# Patient Record
Sex: MALE | Race: WHITE | NOT HISPANIC OR LATINO | URBAN - METROPOLITAN AREA
[De-identification: names, ages, dates, MRNs, and addresses within clinical notes are randomized per-mention and may not be internally consistent; named-entity substitution may affect disease eponyms.]

---

## 2017-04-07 ENCOUNTER — HISTORICAL (OUTPATIENT)
Dept: LAB | Facility: HOSPITAL | Age: 22
End: 2017-04-07

## 2017-04-10 ENCOUNTER — HISTORICAL (OUTPATIENT)
Dept: LAB | Facility: HOSPITAL | Age: 22
End: 2017-04-10

## 2022-04-11 ENCOUNTER — HISTORICAL (OUTPATIENT)
Dept: ADMINISTRATIVE | Facility: HOSPITAL | Age: 27
End: 2022-04-11

## 2022-04-28 VITALS
DIASTOLIC BLOOD PRESSURE: 72 MMHG | OXYGEN SATURATION: 98 % | HEIGHT: 69 IN | SYSTOLIC BLOOD PRESSURE: 113 MMHG | WEIGHT: 187.63 LBS | BODY MASS INDEX: 27.79 KG/M2

## 2022-11-04 ENCOUNTER — HOSPITAL ENCOUNTER (EMERGENCY)
Facility: HOSPITAL | Age: 27
Discharge: HOME | End: 2022-11-05
Attending: SPECIALIST
Payer: MEDICAID

## 2022-11-04 DIAGNOSIS — N50.811 PAIN IN RIGHT TESTICLE: ICD-10-CM

## 2022-11-04 DIAGNOSIS — I86.1 LEFT VARICOCELE: Primary | ICD-10-CM

## 2022-11-04 PROCEDURE — 81001 URINALYSIS AUTO W/SCOPE: CPT | Performed by: SPECIALIST

## 2022-11-04 PROCEDURE — 87491 CHLMYD TRACH DNA AMP PROBE: CPT | Performed by: SPECIALIST

## 2022-11-04 PROCEDURE — 81003 URINALYSIS AUTO W/O SCOPE: CPT | Performed by: SPECIALIST

## 2022-11-04 PROCEDURE — 99284 EMERGENCY DEPT VISIT MOD MDM: CPT | Mod: 25

## 2022-11-04 PROCEDURE — 25000003 PHARM REV CODE 250: Performed by: SPECIALIST

## 2022-11-04 PROCEDURE — 87591 N.GONORRHOEAE DNA AMP PROB: CPT | Performed by: SPECIALIST

## 2022-11-04 RX ORDER — KETOROLAC TROMETHAMINE 10 MG/1
10 TABLET, FILM COATED ORAL
Status: COMPLETED | OUTPATIENT
Start: 2022-11-04 | End: 2022-11-04

## 2022-11-04 RX ADMIN — KETOROLAC TROMETHAMINE 10 MG: 10 TABLET, FILM COATED ORAL at 11:11

## 2022-11-05 VITALS
HEIGHT: 70 IN | BODY MASS INDEX: 25.77 KG/M2 | RESPIRATION RATE: 16 BRPM | OXYGEN SATURATION: 98 % | TEMPERATURE: 98 F | DIASTOLIC BLOOD PRESSURE: 78 MMHG | SYSTOLIC BLOOD PRESSURE: 126 MMHG | HEART RATE: 62 BPM | WEIGHT: 180 LBS

## 2022-11-05 LAB
APPEARANCE UR: CLEAR
BACTERIA #/AREA URNS AUTO: ABNORMAL /HPF
BILIRUB UR QL STRIP.AUTO: NEGATIVE MG/DL
C TRACH DNA SPEC QL NAA+PROBE: NOT DETECTED
COLOR UR AUTO: YELLOW
GLUCOSE UR QL STRIP.AUTO: NEGATIVE MG/DL
KETONES UR QL STRIP.AUTO: NEGATIVE MG/DL
LEUKOCYTE ESTERASE UR QL STRIP.AUTO: NEGATIVE UNIT/L
N GONORRHOEA DNA SPEC QL NAA+PROBE: NOT DETECTED
NITRITE UR QL STRIP.AUTO: NEGATIVE
PH UR STRIP.AUTO: 6.5 [PH]
PROT UR QL STRIP.AUTO: NEGATIVE MG/DL
RBC #/AREA URNS AUTO: ABNORMAL /HPF
RBC UR QL AUTO: NEGATIVE UNIT/L
SP GR UR STRIP.AUTO: 1.02
SQUAMOUS #/AREA URNS AUTO: ABNORMAL /HPF
UROBILINOGEN UR STRIP-ACNC: 1 MG/DL
WBC #/AREA URNS AUTO: ABNORMAL /HPF

## 2022-11-05 RX ORDER — DICLOFENAC SODIUM 50 MG/1
50 TABLET, DELAYED RELEASE ORAL 3 TIMES DAILY PRN
Qty: 30 TABLET | Refills: 0 | Status: SHIPPED | OUTPATIENT
Start: 2022-11-05

## 2022-11-05 NOTE — ED PROVIDER NOTES
Encounter Date: 11/4/2022       History     Chief Complaint   Patient presents with    Testicle Pain     C/o left testicle pain was told at 1100 am today to go to ED for an US denies painful urination      Patient reports left testicular pain beginning yesterday and went to an after hours clinic yesterday morning and was told to come to the ER, this was 12 hours ago; he notes the pain has gradually gotten worse and he feels a small lump on his left testicle, no penile discharge or penile lesions    The history is provided by the patient.   Testicle Pain  This is a new problem. The current episode started yesterday. The problem occurs constantly. The problem has been gradually worsening.   Review of patient's allergies indicates:  No Known Allergies  No past medical history on file.  No past surgical history on file.  No family history on file.     Review of Systems   Constitutional: Negative.    HENT: Negative.     Respiratory: Negative.     Cardiovascular: Negative.    Gastrointestinal: Negative.    Genitourinary: Negative.    Musculoskeletal: Negative.    Neurological: Negative.      Physical Exam     Initial Vitals [11/04/22 2327]   BP Pulse Resp Temp SpO2   126/78 62 16 98.1 °F (36.7 °C) 98 %      MAP       --         Physical Exam    Nursing note and vitals reviewed.  Constitutional: He appears well-developed and well-nourished.   HENT:   Head: Normocephalic and atraumatic.   Eyes: EOM are normal. Pupils are equal, round, and reactive to light.   Neck:   Normal range of motion.  Cardiovascular:  Normal rate, regular rhythm and normal heart sounds.           Pulmonary/Chest: Breath sounds normal.   Abdominal: Abdomen is soft. There is no abdominal tenderness.   Genitourinary:    Genitourinary Comments: Left testicle of normal size and tenderness over the left epididymis with slight enlargement, normal cremaster reflex     Musculoskeletal:         General: Normal range of motion.      Cervical back: Normal range  of motion.     Neurological: He is alert and oriented to person, place, and time.   Skin: Skin is warm and dry. No rash noted.       ED Course   Procedures  Labs Reviewed   URINALYSIS, MICROSCOPIC - Abnormal; Notable for the following components:       Result Value    Bacteria, UA Few (*)     All other components within normal limits   CHLAMYDIA/GONORRHOEAE(GC), PCR   URINALYSIS, REFLEX TO URINE CULTURE          Imaging Results              US Scrotum And Testicles (Preliminary result)  Result time 11/05/22 02:05:50      Preliminary result by Armando Zavala MD (11/05/22 02:05:50)                   Narrative:    START OF REPORT:  Technique: Real time grey scale and color doppler ultrasound was performed on the scrotum and contents.    Comparison: None.    Clinical history: left testicle pain.    Findings:  Right testicle:  Dimensions: Within normal limits and 4.1 x 2.5 x 2.1 cm.  Blood flow:  Intratesticular arterial blood flow: Within normal limits. There is normal spontaneous and phasic flow.  Intratesticular venous blood flow: Within normal limits. There is normal spontaneous and phasic flow.  Right epididymis: Unremarkable.  Right hydrocele: None.  Right varicocele: None.  Left testicle:  Dimensions: Within normal limits and 4.1 x 2.2 x 2.2 cm.  Blood flow:  Intratesticular arterial blood flow: Within normal limits. There is normal spontaneous and phasic flow.  Intratesticular venous blood flow: Within normal limits. There is normal spontaneous and phasic flow.  Left epididymis: Unremarkable.  Left hydrocele: None.  Left varicocele: There is a small left varicocele.      Impression:  1. There is a small left varicocele.  2. No specific evidence of testicular torsion. Details as above.                          Preliminary result by [employer], Rad Results In (11/05/22 02:05:50)                   Narrative:    START OF REPORT:  Technique: Real time grey scale and color doppler ultrasound was performed on the  "scrotum and contents.    Comparison: None.    Clinical history: left testicle pain.    Findings:  Right testicle:  Dimensions: Within normal limits and 4.1 x 2.5 x 2.1 cm.  Blood flow:  Intratesticular arterial blood flow: Within normal limits. There is normal spontaneous and phasic flow.  Intratesticular venous blood flow: Within normal limits. There is normal spontaneous and phasic flow.  Right epididymis: Unremarkable.  Right hydrocele: None.  Right varicocele: None.  Left testicle:  Dimensions: Within normal limits and 4.1 x 2.2 x 2.2 cm.  Blood flow:  Intratesticular arterial blood flow: Within normal limits. There is normal spontaneous and phasic flow.  Intratesticular venous blood flow: Within normal limits. There is normal spontaneous and phasic flow.  Left epididymis: Unremarkable.  Left hydrocele: None.  Left varicocele: There is a small left varicocele.      Impression:  1. There is a small left varicocele.  2. No specific evidence of testicular torsion. Details as above.                                         Medications   ketorolac tablet 10 mg (10 mg Oral Given 11/4/22 0061)                       Patient Vitals for the past 24 hrs:   BP Temp Temp src Pulse Resp SpO2 Height Weight   11/05/22 0244 -- -- -- -- -- -- 5' 10" (1.778 m) 81.6 kg (180 lb)   11/04/22 2327 126/78 98.1 °F (36.7 °C) Oral 62 16 98 % -- --   Somewhat improved with Toradol; discussed findings on ultrasound and discussed varicocele follow-up with urology if needed         Clinical Impression:   Final diagnoses:  [N50.811] Pain in right testicle  [I86.1] Left varicocele (Primary)        ED Disposition Condition    Discharge Stable          ED Prescriptions       Medication Sig Dispense Start Date End Date Auth. Provider    diclofenac (VOLTAREN) 50 MG EC tablet Take 1 tablet (50 mg total) by mouth 3 (three) times daily as needed (pain). 30 tablet 11/5/2022 -- Efra Hyatt MD          Follow-up Information       Follow up With " Specialties Details Why Contact Info    Urology   As needed              Efra Hyatt MD  11/05/22 2996